# Patient Record
Sex: MALE | Employment: FULL TIME | URBAN - METROPOLITAN AREA
[De-identification: names, ages, dates, MRNs, and addresses within clinical notes are randomized per-mention and may not be internally consistent; named-entity substitution may affect disease eponyms.]

---

## 2023-05-17 ENCOUNTER — OFFICE VISIT (OUTPATIENT)
Dept: SURGERY | Age: 43
End: 2023-05-17
Payer: COMMERCIAL

## 2023-05-17 VITALS
BODY MASS INDEX: 29.92 KG/M2 | TEMPERATURE: 97.5 F | OXYGEN SATURATION: 96 % | RESPIRATION RATE: 16 BRPM | HEART RATE: 69 BPM | WEIGHT: 209 LBS | HEIGHT: 70 IN | DIASTOLIC BLOOD PRESSURE: 105 MMHG | SYSTOLIC BLOOD PRESSURE: 161 MMHG

## 2023-05-17 DIAGNOSIS — K64.5 PERIANAL VENOUS THROMBOSIS: Primary | ICD-10-CM

## 2023-05-17 PROCEDURE — 46083 INC THROMBOSED HROID XTRNL: CPT | Performed by: SURGERY

## 2023-05-17 PROCEDURE — 99203 OFFICE O/P NEW LOW 30 MIN: CPT | Performed by: SURGERY

## 2023-05-17 RX ORDER — CLOBETASOL PROPIONATE 0.46 MG/ML
1 SOLUTION TOPICAL DAILY PRN
COMMUNITY
Start: 2023-05-15

## 2023-05-17 RX ORDER — COVID-19 ANTIGEN TEST
1 KIT MISCELLANEOUS PRN
COMMUNITY

## 2023-05-17 RX ORDER — TADALAFIL 5 MG/1
5 TABLET ORAL DAILY
COMMUNITY
Start: 2023-04-05

## 2023-05-17 NOTE — PROGRESS NOTES
Subjective:     Patient is a 39 y.o. man with thrombosed hemorrhoid    The patient is referred by Dr. Luis Angel Braun. Results of consultation will be shared via electronic medical record    HPI: Mr. Krissy Bird reports sharp rectal pain, swelling, and mass began Sunday night. Nothing seemed to set it off. He has never had this before. Pain is severe with BM. Area seems to be growing each day    Not in a hospital admission. Allergies   Allergen Reactions    Gemifloxacin Hives     rash      Guaifenesin Hives      Social History     Tobacco Use    Smoking status: Former     Types: Cigarettes    Smokeless tobacco: Never    Tobacco comments:     Quit smoking cigarettes when he was 25 years, occasionally vapes-instructed to quit    Substance Use Topics    Alcohol use: Yes     Alcohol/week: 6.0 standard drinks     Types: 6 Standard drinks or equivalent per week      FH: No FH of Colon cancer    Review of Systems    GEN: reviewed and negative except as noted in HPI. GI: reviewed and negative except as noted in HPI. Objective:     GEN: appears well, no distress, appears stated age  PSYCH: normal mood, normal affect  NECK: no neck masses, trachea midline  ENT: moist oral mucosa; anicteric  SKIN: no rash or jaundice  CV: regular heart rate and rhythm  PULM: normal respiratory effort, no wheezing  GI: soft non tender abdomen. Normal bowel sounds  RECTAL: examined with chaperone Gia Mendieta RN. Left lateral tender thrombosed hemorrhoid with near erosion of clot at the apex  EXT/NEURO: normal gait, strength/sensation grossly intact in all extremities    Assessment: Thrombosed hemorrhoid with near erosion    Plan:     Discussed RBA of clot evacuation. Discussed alternatives include hemorrhoidectomy and observation. Discussed bleeding, infection, recurrence risks. He elects to proceed. Discussed fiber, hydration, hemorrhoid prevention.      Dylan Baker M.D.  5/17/23   10:14 AM      PROCEDURE NOTE    After informed consent was